# Patient Record
Sex: FEMALE | Race: WHITE | NOT HISPANIC OR LATINO | URBAN - METROPOLITAN AREA
[De-identification: names, ages, dates, MRNs, and addresses within clinical notes are randomized per-mention and may not be internally consistent; named-entity substitution may affect disease eponyms.]

---

## 2023-08-03 PROBLEM — C50.911 INVASIVE DUCTAL CARCINOMA OF BREAST, FEMALE, RIGHT: Status: ACTIVE | Noted: 2023-08-03

## 2023-09-25 ENCOUNTER — DOCUMENTATION ONLY (OUTPATIENT)
Dept: ADMINISTRATIVE | Facility: OTHER | Age: 48
End: 2023-09-25

## 2023-10-03 NOTE — PROGRESS NOTES
RADIATION ONCOLOGY CONSULTATION  [facility]      NAME: Andrew Ruggiero    : 1975 SEX: F MR#: [id number]   DIAGNOSIS: Locally advanced right breast cancer   REFERRING PHYSICIAN: Ananya Quiroz MD   DATE OF CONSULTATION: 2023       IDENTIFICATION:  The patient is a 48-year-old postmenopausal female who presents with a newly diagnosed stage IIIB [id number] central right breast grade II infiltrating ductal carcinoma (estrogen receptor positive, progesterone receptor negative and HER-2/lillian negative), status post mastectomy and sentinel lymph node biopsy with 1/3 positive axillary lymph nodes and a positive internal mammary lymph node.  The patient is being seen in consultation for consideration of radiotherapy at the request of Dr. Quiroz.    HISTORY OF PRESENT ILLNESS:  The patient underwent a breast reduction and lift in  with Dr. Medina.  She underwent implant augmentation with abdominoplasty in .  In , she had bilateral breast abscesses, for which she underwent explantation bilaterally, followed by a bilateral lift by Dr. Weiler.     In 2022, the patient first palpated a right breast lump.  Bilateral screening mammogram on 2022, showed a moderately dense, asymmetric postoperative breast. There was no evidence of malignancy or lymphadenopathy, with imaging read as BI-RADS 2.  She reports that since that time the right breast slowly increased in size and was told that she needed to wait a full year prior to undergoing repeat mammography.     On 2023, bilateral screening mammogram showed a new asymmetric density with obscured margins in the retroareolar right breast measuring 3.4 x 3.2 cm, with associated skin thickening and nipple retraction. There is no mention of the presence or absence of suspicious microcalcifications on the radiology report, read as BI-RADS 0.     A right ultrasound on 2023, showed a 2.7 x 3.1 x 3.6 cm ill-defined hypodense mass  "with posterior acoustic shadowing in the retroareolar right breast with associated nipple retraction.  There was a 3 mm anechoic focus at the 6 o'clock position thought to represent a dilated intramammary duct with calcifications versus complex cyst.  There were normal-appearing axillary lymph nodes.  The patient does not appear to have undergone a right-sided diagnostic mammogram.     On 07/26/2023, she underwent an ultrasound-guided right breast core needle biopsy with clip placement.  Pathology demonstrated grade II infiltrating ductal carcinoma without lymphovascular space invasion.  The tumor was estrogen receptor 99% positive and progesterone receptor negative.  It was HER-2/lillian negative, being 2+ on immunohistochemistry and non-amplified on FISH analysis.  The Ki-67 was 10% to 20%.  In addition, a minute focus of grade I to II ductal carcinoma in situ was also noted.     She saw Dr. Quiroz on 08/02/2023, who discussed the patient's diagnosis and treatment options, with the patient being interested in mastectomy with immediate reconstruction.  Genetic testing was ordered and reportedly negative.  She saw Dr. Quiroz again on 09/08/2023, at which time plans were made for bilateral skin-sparing mastectomies and sentinel lymph node biopsy with PAP flap reconstruction.     On 09/15/2023, she underwent bilateral skin-sparing mastectomies and right sentinel lymph node biopsy with PAP flap reconstruction by Dr. Quiroz and Dr. Arciniega.  The left breast specimen measured 26.5 x 23.5 x 9.5 cm and demonstrated no evidence of malignancy.  The right breast specimen measured 19.3 x 18.5 x 8.7 cm and revealed 6.4 x 4.2 x 4.2 cm of grade II infiltrating ductal carcinoma with lymphovascular space invasion.  The closest margin was 3 mm anteriorly.  In addition, there were 1/3 positive lymph nodes with the disease measuring 9 mm without extracapsular extension.  There was a "right internal mammary gland excisional biopsy," " later determined to have been taken by Dr. Arciniega at the time of reconstruction, which demonstrated two separate nodules of grade II infiltrating ductal carcinoma measuring 7 mm and 4 mm with a focally positive margin.     She saw Dr. Quiroz postoperatively on 2023, who noted that she was healing well postoperatively, and referred the patient to Medical Oncology and Radiation Oncology.  She is scheduled to follow up with Dr. Arciniega later this afternoon, and has a consult with Dr. Anton on 10/05/2023.    REVIEW OF SYSTEMS:  The patient reports that the bilateral postoperative soreness is gradually improving.  She denies any new masses, rashes or axillary lymphadenopathy.  She notes that the drains are decreasing in output and otherwise reports constipation with anesthesia.  She denies any high fevers, shaking chills, drenching night sweats or recent weight loss.     She denies any recent changes in vision, hearing or swallowing, shortness of breath at rest, dyspnea on exertion, cough, chest pain, abdominal pain, nausea, vomiting, diarrhea, bright-red blood per rectum or urinary symptoms.  She denies any focal numbness, tingling, weakness, severe headaches, diplopia or ataxia.  All other systems reviewed are negative.    PAST MEDICAL HISTORY:  Breast cancer as above; diabetes mellitus, type 2; ADHD; OCD; insomnia.    PAST SURGICAL HISTORY:  Status post RATNA for endometriosis, status post bilateral skin-sparing mastectomy and sentinel lymph node biopsy with flap reconstruction as above, status post bilateral breast lift in , status post bilateral breast augmentation in , status post bilateral explantation in , status post bilateral lift in , status post abdominoplasty in .    PAST GYNECOLOGIC HISTORY:  .  Menarche at the age of 14.  First live birth at the age of 27.  She did not breastfeed.  Menopause at the age of 46.  Oral contraceptive pill use, none.  Hormone replacement therapy  use, none.    PAST RADIATION THERAPY:  None.    MEDICATIONS:  Xanax, Zoloft, Adderall, Seroquel, melatonin, Ozempic.    ALLERGIES:  Morphine causes pruritus.    FAMILY HISTORY:  Father with prostate cancer.  Maternal aunt with thyroid cancer.  No family history of breast or ovarian malignancy.    SOCIAL HISTORY:  The patient has a 45-pack-year history of smoking cigarettes and reports quitting two months ago.  She does not drink alcohol and denies illicit drug use.  She lives in [place], is  and has one child.  She works as an embalmer.    PHYSICAL EXAMINATION:  VITAL SIGNS:  Blood pressure 109/65, heart rate 87, temperature 98.6, height 5 feet 3 inches, weight 200 pounds, oxygen saturation 98% on room air.  ECOG score of 0-1.   GENERAL:  The patient is a pleasant well-nourished, well-developed  female in no acute distress.   HEENT:  Normocephalic, atraumatic.  Extraocular muscles intact.  Pupils equally round and reactive to light.  Sclerae anicteric.  Oral cavity and oropharynx are clear without erythema, exudate, masses or ulcerations.   NECK:  Supple without lymphadenopathy or thyromegaly.   HEART:  Regular rate and rhythm.  Normal S1, S2.  No murmurs, gallops or rubs.     LUNGS:  Clear to auscultation bilaterally.  No wheezes, rhonchi or rales.   BACK:  No spinal or costovertebral angle tenderness.   ABDOMEN:  Soft, nontender and nondistended.  No masses or hepatosplenomegaly.   EXTREMITIES:  Warm and well perfused.  No clubbing, cyanosis or edema.  There are dressings over the bilateral thighs from recent flaps which are clean, dry and intact with bilateral drains producing serosanguineous fluid.   NEUROLOGIC:  Cranial nerves two through 12 grossly intact.  Motor and sensory intact bilaterally.  Finger-nose-finger and gait within normal limits.  1+ patellar deep tendon reflexes.  No clonus.   BREASTS:  The patient is status post bilateral skin-sparing and non-nipple-sparing mastectomies with  autologous tissue reconstruction.  The left breast demonstrates a well-healed centimeter vertical scar and healing 24 cm inframammary scar without palpable masses, rashes or axillary lymphadenopathy.  There is a YESENIA drain in place inferolateral to the reconstructed breast with serosanguineous output.  The right reconstructed breast demonstrates a 10 cm vertical scar and 23 cm inframammary scar without surrounding erythema or exudate, suspicious masses, rashes, peau d'orange, skin retraction or axillary lymphadenopathy.  There is a YESENIA drain in place inferolateral to the reconstructed breast with serosanguineous output.    LABORATORIES:  On 07/20/2023, sodium 141, potassium 3.5, chloride 109, CO2 of 28, BUN 12, creatinine 0.71, glucose 106, AST 13, ALT 25, total bilirubin 0.3, alkaline phosphatase 97.    ASSESSMENT AND PLAN:  The patient is a 48-year-old postmenopausal female who presents with a newly diagnosed stage IIIB [id number] central right breast grade II infiltrating ductal carcinoma (ER positive, WV negative and HER-2/lillian negative), status post bilateral mastectomies and sentinel lymph node biopsy with 1/3 positive axillary lymph nodes and a positive internal mammary lymph node.     Based upon this patient's history and presentation, I believe that she is likely an appropriate candidate for external beam radiation therapy.  Given the patient's unexpected locally advanced presentation with a tumor greater than 5 cm, as well as a positive axillary lymph node and positive internal mammary lymph node, I do feel that further staging is warranted.  She is scheduled to follow up closely with Dr. Anton in consultation and will defer any PET or CT imaging to her.  The patient understands that in the event that there is evidence of stage IV disease, that the mainstay of her future therapy would likely be palliative systemic therapy.     We discussed the patient's surgical options being mastectomy versus breast  conservation therapy consisting of a lumpectomy followed by radiation with the overall survival being equivalent with either treatment modality.  Based upon the extent of disease in the right breast, I agree with the decision to pursue a mastectomy with negative surgical margins and unexpectedly positive axillary internal mammary lymph nodes, from which she appears to be healing well.     With regard to systemic therapy, I think she will almost certainly be a candidate for adjuvant chemotherapy given her age, as well as presentation with T3N3b disease, which I will defer to Dr. Anton.  She understands that in the case, that both chemotherapy and radiation are indicated, which I think is likely scenario that chemotherapy would be sequenced prior to radiation, which would begin approximately one month after the conclusion of systemic therapy.  She also understands that she will likely be a candidate for endocrine therapy following irradiation given her ER positive disease.     I explained to Ms. Ruggiero and her mother that the role of external beam radiation therapy is to reduce the risk of local and/or regional recurrence by at least two-thirds, as well as to offer a small overall survival benefit.  In her particular case, I would estimate this risk of recurrence to be at least 35% and possibly as high as 40% without radiotherapy with the goal of treatment to reduce this risk to the 10% to under 15% range.  In her particular case, I would plan to treat the chest wall, reconstructed breast, axilla, supraclavicular fossa, as well as internal mammary elena chain, requiring the use of intensity-modulated radiotherapy to treat the internal mammary nodes.  Given the focally positive margin at the site of the internal mammary elena excision, I would really boost that site if surgical clips were left at the time of excision or, alternatively, the entire chain was not possible to identify the site of the positive node.      The risks, benefits, side effects, alternatives to, indications for and mechanisms of external beam radiation therapy were explained in full to the patient.  She and her mother who accompanied her to today's visit asked multiple appropriate questions, all of which were answered to their apparent satisfaction.  This conversation included a discussion of possible short-term side effects, including but not limited to dermatitis with skin tenderness, erythema, pruritus, possible desquamation, local hair loss, dysphagia, odynophagia and fatigue.      We also discussed the possible long-term side effects, including but not limited to residual hyperpigmentation, telangiectasias, post-radiation contracture as she is to undergo immediate reconstruction, potentially requiring additional plastic surgery in the future, as well as increased risk of fat necrosis.  Also, would be at very low risk for ipsilateral rib fracture with trauma, low risk of pneumonitis and/or pulmonary fibrosis, increased risk of lymphedema, and very low risk of brachial plexopathy and/or secondary tumor formation over decades.  She agreed with the proposed treatment plan and informed consent was obtained.     I will continue to follow Ms. Ruggiero as she undergoes Medical Oncology evaluation and staging imaging, as well as anticipated chemotherapy.  When appropriate, she will return to [facility] to undergo CT-guided simulation in the supine treatment position with the use of a tilt board and Vac-Mireille for custom immobilization.  After a customized treatment plan has been designed, she would undergo verification films and initiate a course of post-mastectomy intensity-modulated radiotherapy.     Thank you very much, Dr. Quiroz, for this consultation and the opportunity to participate in the care of this patient.  Please do not hesitate to contact me should you have any questions, concerns and/or require additional  information.        JACKI Burgos MD